# Patient Record
Sex: MALE | Race: WHITE | NOT HISPANIC OR LATINO | Employment: FULL TIME | URBAN - METROPOLITAN AREA
[De-identification: names, ages, dates, MRNs, and addresses within clinical notes are randomized per-mention and may not be internally consistent; named-entity substitution may affect disease eponyms.]

---

## 2019-08-05 ENCOUNTER — OCCMED (OUTPATIENT)
Dept: URGENT CARE | Age: 44
End: 2019-08-05
Payer: OTHER MISCELLANEOUS

## 2019-08-05 ENCOUNTER — APPOINTMENT (OUTPATIENT)
Dept: RADIOLOGY | Age: 44
End: 2019-08-05
Payer: OTHER MISCELLANEOUS

## 2019-08-05 DIAGNOSIS — S69.91XA INJURY OF RIGHT WRIST, INITIAL ENCOUNTER: Primary | ICD-10-CM

## 2019-08-05 DIAGNOSIS — S69.91XA INJURY OF RIGHT WRIST, INITIAL ENCOUNTER: ICD-10-CM

## 2019-08-05 PROCEDURE — 99283 EMERGENCY DEPT VISIT LOW MDM: CPT | Performed by: PHYSICIAN ASSISTANT

## 2019-08-05 PROCEDURE — 29125 APPL SHORT ARM SPLINT STATIC: CPT | Performed by: PHYSICIAN ASSISTANT

## 2019-08-05 PROCEDURE — 73110 X-RAY EXAM OF WRIST: CPT

## 2019-08-05 PROCEDURE — G0382 LEV 3 HOSP TYPE B ED VISIT: HCPCS | Performed by: PHYSICIAN ASSISTANT

## 2019-08-12 ENCOUNTER — EVALUATION (OUTPATIENT)
Dept: PHYSICAL THERAPY | Facility: REHABILITATION | Age: 44
End: 2019-08-12
Payer: OTHER MISCELLANEOUS

## 2019-08-12 ENCOUNTER — APPOINTMENT (OUTPATIENT)
Dept: RADIOLOGY | Facility: MEDICAL CENTER | Age: 44
End: 2019-08-12
Payer: OTHER MISCELLANEOUS

## 2019-08-12 VITALS
BODY MASS INDEX: 24.6 KG/M2 | HEART RATE: 80 BPM | WEIGHT: 181.6 LBS | SYSTOLIC BLOOD PRESSURE: 119 MMHG | DIASTOLIC BLOOD PRESSURE: 75 MMHG | HEIGHT: 72 IN

## 2019-08-12 DIAGNOSIS — M25.531 RIGHT WRIST PAIN: Primary | ICD-10-CM

## 2019-08-12 DIAGNOSIS — S69.91XA INJURY OF RIGHT WRIST, INITIAL ENCOUNTER: Primary | ICD-10-CM

## 2019-08-12 DIAGNOSIS — M79.641 RIGHT HAND PAIN: ICD-10-CM

## 2019-08-12 DIAGNOSIS — S69.91XD RIGHT WRIST INJURY, SUBSEQUENT ENCOUNTER: ICD-10-CM

## 2019-08-12 PROCEDURE — 99204 OFFICE O/P NEW MOD 45 MIN: CPT | Performed by: FAMILY MEDICINE

## 2019-08-12 PROCEDURE — 73110 X-RAY EXAM OF WRIST: CPT

## 2019-08-12 PROCEDURE — 73130 X-RAY EXAM OF HAND: CPT

## 2019-08-12 PROCEDURE — 97110 THERAPEUTIC EXERCISES: CPT | Performed by: PHYSICAL THERAPIST

## 2019-08-12 PROCEDURE — 97161 PT EVAL LOW COMPLEX 20 MIN: CPT | Performed by: PHYSICAL THERAPIST

## 2019-08-12 RX ORDER — CIPROFLOXACIN 500 MG/1
1 TABLET, FILM COATED ORAL EVERY 12 HOURS
COMMUNITY
Start: 2015-08-04
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
## 2019-08-12 NOTE — LETTER
August 12, 2019     Patient: Xiomara Youssef   YOB: 1975   Date of Visit: 8/12/2019       To Whom it May Concern:    Paddyblanka Louise is under my professional care  He was seen in my office on 8/12/2019  He may return to work on 8/13/19  I recommended return to work with light duty instructions to include no crawling, no pulling pushing or lifting with right hand more than 10 lb and patient to wear his right wrist brace at work at all times  If you have any questions or concerns, please don't hesitate to call           Sincerely,          Louisa Brandt III, DO        CC: Xiomara Youssef

## 2019-08-12 NOTE — PATIENT INSTRUCTIONS
Explained the patient that he has a wrist sprain possibly the small avulsion fracture  I recommended wearing wrist brace at work and then as needed at home and to remove throughout the day to perform range-of-motion exercises to prevent wrist stiffness  I recommended return to work with light duty or instructions to include no crawling, no pulling pushing or lifting with right hand more than 10 lb  I explained to patient risk of non-operative treatment for fracture including but not limited to slippage or movement of fracture and healing of fracture in wrong location that could result in need for surgery or development of arthritis and limited range of motion after healing is resolved  Patient expressed understanding and agreed with plan to pursue non-operative treatment for fracture
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
## 2019-08-19 ENCOUNTER — OFFICE VISIT (OUTPATIENT)
Dept: PHYSICAL THERAPY | Facility: REHABILITATION | Age: 44
End: 2019-08-19
Payer: OTHER MISCELLANEOUS

## 2019-08-19 DIAGNOSIS — M25.531 RIGHT WRIST PAIN: Primary | ICD-10-CM

## 2019-08-19 DIAGNOSIS — S69.91XD RIGHT WRIST INJURY, SUBSEQUENT ENCOUNTER: ICD-10-CM

## 2019-08-19 PROCEDURE — 97110 THERAPEUTIC EXERCISES: CPT | Performed by: PHYSICAL THERAPIST

## 2019-08-19 NOTE — PROGRESS NOTES
Daily Note     Today's date: 2019  Patient name: Edy Henry  : 1975  MRN: [id number]  Referring provider: Jessica Mendieta DO  Dx:   Encounter Diagnosis     ICD-10-CM    1  Right wrist pain M25 531    2  Right wrist injury, subsequent encounter [id number] [id number]                 Subjective: Pt reports significant improvement in right wrist pain and ROM since evaluation  He reports he has been compliant with his home program and use of his wrist splint, has not felt that he has needed ice for pain control  He rates his pain is a 1/10 prior to session today  Objective: See treatment diary below  Precautions: possible triquetrum avulsion fracture    Daily Treatment Diary   Assessment            Eval/Reval MD            FOTO [id number]           POC Signed             HEP Issued  MD            Manuals             1) R wrist PROM  Full AROM           Exercise Diary              Wrist AROM: Flex/Ext/RD/UD 5"x30 ea 1#  20 ea           Elbow AROM:  Pro/Sup 5" x 30 ea 1#  20 ea           Putty Squeezes Yellow HEP           Finger Web: Flex/Ext  Blue  30  Red  30           Gripper  Yellow  30                                                                                                                                                          Modalities            CP R Wrist  Post-Tx  declined             HEP: Wrist AROM, Elbow AROM, Putty Squeezes (19)    Assessment: Pt presents to session with significant improvement in his right wrist AROM since evaluation  Pt demonstrated independence with home program and was without questions/concerns  Pt with good tolerance to progression of program reporting minimal discomfort only with resisted ulnar deviation with subsided with completion  He reported minimal fatigue with completion  Pt to follow-up with MD in one week, advised to continue with current home program and utilization of brace for work activities         Plan: Continue per plan of care  Progress treatment as tolerated  Pt scheduled to follow-up with MD on 8/26/19  Pt scheduled for one more visit with PT to review home program and address any questions/concerns - he agrees with this plan

## 2019-08-26 ENCOUNTER — APPOINTMENT (OUTPATIENT)
Dept: RADIOLOGY | Facility: MEDICAL CENTER | Age: 44
End: 2019-08-26
Payer: OTHER MISCELLANEOUS

## 2019-08-26 ENCOUNTER — APPOINTMENT (OUTPATIENT)
Dept: PHYSICAL THERAPY | Facility: REHABILITATION | Age: 44
End: 2019-08-26
Payer: OTHER MISCELLANEOUS

## 2019-08-26 VITALS
BODY MASS INDEX: 25.35 KG/M2 | HEIGHT: 72 IN | SYSTOLIC BLOOD PRESSURE: 114 MMHG | DIASTOLIC BLOOD PRESSURE: 76 MMHG | HEART RATE: 52 BPM | WEIGHT: 187.2 LBS

## 2019-08-26 DIAGNOSIS — S69.91XA INJURY OF RIGHT WRIST, INITIAL ENCOUNTER: Primary | ICD-10-CM

## 2019-08-26 DIAGNOSIS — Z01.89 ENCOUNTER FOR UPPER EXTREMITY COMPARISON IMAGING STUDY: ICD-10-CM

## 2019-08-26 DIAGNOSIS — S69.91XA INJURY OF RIGHT WRIST, INITIAL ENCOUNTER: ICD-10-CM

## 2019-08-26 PROCEDURE — 73100 X-RAY EXAM OF WRIST: CPT

## 2019-08-26 PROCEDURE — 73110 X-RAY EXAM OF WRIST: CPT

## 2019-08-26 PROCEDURE — 99213 OFFICE O/P EST LOW 20 MIN: CPT | Performed by: FAMILY MEDICINE
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
## 2019-08-26 NOTE — LETTER
August 26, 2019     Patient: Aris Ray   YOB: 1975   Date of Visit: 8/26/2019       To Whom it May Concern:    Coffey Conchissuhas is under my professional care  He was seen in my office on 8/26/2019  He may return to work on 08/26/2019 full duty no restrictions  If you have any questions or concerns, please don't hesitate to call           Sincerely,          Guadlupe Galeazzi III, DO        CC: Aris Ray

## 2023-01-23 NOTE — PATIENT INSTRUCTIONS
Patient may return to all activities  Patient return to work full duty no restrictions  Patient may discontinue formal physical therapy in continue range-of-motion exercises and strength for maintenance at home  Patient may discontinue his brace Cephalexin Counseling: I counseled the patient regarding use of cephalexin as an antibiotic for prophylactic and/or therapeutic purposes. Cephalexin (commonly prescribed under brand name Keflex) is a cephalosporin antibiotic which is active against numerous classes of bacteria, including most skin bacteria. Side effects may include nausea, diarrhea, gastrointestinal upset, rash, hives, yeast infections, and in rare cases, hepatitis, kidney disease, seizures, fever, confusion, neurologic symptoms, and others. Patients with severe allergies to penicillin medications are cautioned that there is about a 10% incidence of cross-reactivity with cephalosporins. When possible, patients with penicillin allergies should use alternatives to cephalosporins for antibiotic therapy.